# Patient Record
Sex: MALE | Race: WHITE | NOT HISPANIC OR LATINO | Employment: OTHER | URBAN - METROPOLITAN AREA
[De-identification: names, ages, dates, MRNs, and addresses within clinical notes are randomized per-mention and may not be internally consistent; named-entity substitution may affect disease eponyms.]

---

## 2025-03-31 ENCOUNTER — APPOINTMENT (OUTPATIENT)
Dept: DERMATOLOGY | Facility: CLINIC | Age: 20
End: 2025-03-31
Payer: OTHER GOVERNMENT

## 2025-07-06 ENCOUNTER — OFFICE VISIT (OUTPATIENT)
Dept: URGENT CARE | Age: 20
End: 2025-07-06
Payer: OTHER GOVERNMENT

## 2025-07-06 VITALS
RESPIRATION RATE: 16 BRPM | OXYGEN SATURATION: 99 % | HEART RATE: 68 BPM | DIASTOLIC BLOOD PRESSURE: 70 MMHG | SYSTOLIC BLOOD PRESSURE: 118 MMHG | TEMPERATURE: 98 F

## 2025-07-06 DIAGNOSIS — H61.22 IMPACTED CERUMEN OF LEFT EAR: Primary | ICD-10-CM

## 2025-07-06 PROCEDURE — 1036F TOBACCO NON-USER: CPT | Performed by: PHYSICIAN ASSISTANT

## 2025-07-06 PROCEDURE — 99203 OFFICE O/P NEW LOW 30 MIN: CPT | Performed by: PHYSICIAN ASSISTANT

## 2025-07-06 ASSESSMENT — PATIENT HEALTH QUESTIONNAIRE - PHQ9
1. LITTLE INTEREST OR PLEASURE IN DOING THINGS: NOT AT ALL
SUM OF ALL RESPONSES TO PHQ9 QUESTIONS 1 AND 2: 0
2. FEELING DOWN, DEPRESSED OR HOPELESS: NOT AT ALL

## 2025-07-06 NOTE — PROGRESS NOTES
Subjective   Patient ID: Johnathon Farley is a 20 y.o. male. They present today with a chief complaint of Ear Fullness (Left ear x today ).    History of Present Illness  Allergies: Reviewed.   Past medical history: Reviewed.   Past surgical history Reviewed.   Social history: Reviewed.    HPI: Patient is a 20-year-old white female, no significant past medical history, presents to clinic for to complaint of left ear fullness.  Patient states he woke up this morning with muffled hearing in his left ear.  Denies any pain.  No drainage.  No upper respiratory congestion rhinorrhea cough or sputum production.  No fever or chills.  No further complaints.      Ear Fullness      Past Medical History  Allergies as of 07/06/2025   • (No Known Allergies)       Prescriptions Prior to Admission[1]       Medical History[2]    Surgical History[3]     reports that he has never smoked. He has never used smokeless tobacco.    Review of Systems  Review of Systems                               Objective    Vitals:    07/06/25 1045   BP: 118/70   Pulse: 68   Resp: 16   Temp: 36.7 °C (98 °F)   SpO2: 99%     No LMP for male patient.    Physical Exam  General: Vitals Noted. No distress. Normocephalic.     HEENT: Right TM is within normal limits with adequate light reflex and visible landmarks.  Left TM is unable to visualize due to cerumen impaction approximately the mid EAC.  EOMI, normal conjunctiva, patent nares, Normal OP    Neck: Supple with no adenopathy.     Cardiac: Regular Rate and Rhythm. No murmur.     Pulmonary: Equal breath sounds bilaterally. No wheezes, rhonchi, or rales.    Abdomen: Soft, non-tender, with normal bowel sounds.     Musculoskeletal: Moves all extremities, no effusion, no edema.     Skin: No obvious rashes.    Ear Cerumen Removal    Date/Time: 7/6/2025 10:50 AM    Performed by: Keshav Laughlin PA-C  Authorized by: Keshav Laughlin PA-C    Consent:     Consent obtained:  Verbal    Consent given by:   Patient    Risks, benefits, and alternatives were discussed: yes      Risks discussed:  Bleeding, dizziness, infection, incomplete removal, pain and TM perforation  Universal protocol:     Procedure explained and questions answered to patient or proxy's satisfaction: yes      Patient identity confirmed:  Verbally with patient  Procedure details:     Location:  L ear    Procedure type: irrigation      Procedure outcomes: cerumen removed    Post-procedure details:     Inspection:  No bleeding, ear canal clear and TM intact    Hearing quality:  Improved    Procedure completion:  Tolerated well, no immediate complications      Point of Care Test & Imaging Results from this visit    Imaging  No results found.    Cardiology, Vascular, and Other Imaging  No other imaging results found for the past 2 days      Diagnostic study results (if any) were reviewed by Keshav Laughlin PA-C.    Assessment/Plan   Allergies, medications, history, and pertinent labs/EKGs/Imaging reviewed by Keshav Laughlin PA-C.     Medical Decision Making  Patient was seen eval in the clinic for chief complaint of left ear fullness.  On exam patient is nontoxic very well-appearing respite comfortably no acute distress.  Vital signs are stable, afebrile.  Chest is clear, heart is regular, belly is diffusely soft and nontender.  Evaluation of appears above significant for an obvious cerumen impaction.  50-50 mixture of hydrogen peroxide and warm water was used to irrigate the ER.  There is complete removal of cerumen.  Evaluation status post removal reveals a nontraumatized EAC with the TM that is within normal limits.  He discharged home at this time.  Vies follow with his primary care physician as needed.  Reviewed my impression, plan, strict return precautions with the patient.  He expresses understanding and agreement plan of care.      Orders and Diagnoses  There are no diagnoses linked to this encounter.      Medical Admin  Record      Follow Up Instructions  No follow-ups on file.    Patient disposition: Home    Electronically signed by Keshav Laughlin PA-C  10:50 AM           [1]  (Not in a hospital admission)  [2]  No past medical history on file.  [3]  No past surgical history on file.